# Patient Record
Sex: FEMALE | Race: WHITE | Employment: FULL TIME | URBAN - METROPOLITAN AREA
[De-identification: names, ages, dates, MRNs, and addresses within clinical notes are randomized per-mention and may not be internally consistent; named-entity substitution may affect disease eponyms.]

---

## 2017-01-12 LAB — CREATININE, EXTERNAL: 0.67

## 2017-02-03 ENCOUNTER — OFFICE VISIT (OUTPATIENT)
Dept: INTERNAL MEDICINE CLINIC | Age: 47
End: 2017-02-03

## 2017-02-03 VITALS
SYSTOLIC BLOOD PRESSURE: 94 MMHG | HEIGHT: 62 IN | HEART RATE: 114 BPM | RESPIRATION RATE: 14 BRPM | BODY MASS INDEX: 23.37 KG/M2 | OXYGEN SATURATION: 97 % | WEIGHT: 127 LBS | DIASTOLIC BLOOD PRESSURE: 66 MMHG | TEMPERATURE: 98.3 F

## 2017-02-03 DIAGNOSIS — J06.9 VIRAL UPPER RESPIRATORY TRACT INFECTION: Primary | ICD-10-CM

## 2017-02-03 RX ORDER — AZITHROMYCIN DIHYDRATE 250 MG/1
TABLET, FILM COATED ORAL
Qty: 6 TAB | Refills: 0 | Status: SHIPPED | OUTPATIENT
Start: 2017-02-03 | End: 2017-02-08

## 2017-02-03 RX ORDER — ZOLEDRONIC ACID 5 MG/100ML
5 INJECTION, SOLUTION INTRAVENOUS ONCE
COMMUNITY

## 2017-02-03 NOTE — PROGRESS NOTES
Morene Hodgkin is a 42 y.o. female who was seen in clinic today (2/3/2017). Patient was seen with Dr Renée Arnold (R3 at [facility]). Assessment & Plan:  Nick Lewis was seen today for chills. Diagnoses and all orders for this visit:    Viral upper respiratory tract infection- discussed diagnosis & treatment options, most likely viral at this time, reviewed the importance of avoiding unnecessary antibiotic therapy, reviewed which OTC medications to use and avoid, expected time course for resolution & red flags were reviewed with her to RTC or notify me. Due to immunosuppressive meds will give abx below to use if sx get worse.   -     ZITHROMAX Z-MANNY 250 mg tablet; Use as directed         Follow-up Disposition:  Return if symptoms worsen or fail to improve.       ----------------------------------------------------------------------    Subjective:  URI Review  Nick Lewis returns to clinic today to talk about: ISA symptoms for 1 day ago, which are gradually worsening since that time. She reports fevers up to 101.5 degrees, tachycardic, chills, sweats, sinus congestion and chest congestion. Treatments have included: Tylenol which have been somewhat effective. Relevant PMH: Crohns & SLE on immunosuppressant medications. Patient reports sick contacts: yes - son. Prior to Admission medications    Medication Sig Start Date End Date Taking? Authorizing Provider   zoledronic acid (RECLAST) 5 mg/100 mL soln 5 mg by IntraVENous route once. yearly   Yes Historical Provider   SYNTHROID 112 mcg tablet TAKE 2 TABLETS BY MOUTH EVERY MORNING BEFORE BREAKFAST 12/29/16  Yes Bobbi Cuellar MD   albuterol (PROVENTIL HFA, VENTOLIN HFA, PROAIR HFA) 90 mcg/actuation inhaler Take 1 Puff by inhalation every four (4) hours as needed for Wheezing or Shortness of Breath. Yes Historical Provider   CHOLECALCIFEROL, VITAMIN D3, (VITAMIN D3 PO) Take 5,000 Units by mouth daily.    Yes Historical Provider   allergy injection Yes Historical Provider   hydroxychloroquine (PLAQUENIL) 200 mg tablet Take 200 mg by mouth daily. Yes Historical Provider   cyanocobalamin (VITAMIN B-12) 1,000 mcg/mL injection 1,000 mcg by IntraMUSCular route every fourteen (14) days. Yes Historical Provider   acetaminophen-codeine (TYLENOL-CODEINE #3) 300-30 mg per tablet Take 1 tablet by mouth every four (4) hours as needed for Pain. 1-2 tablets   Yes Historical Provider   ethinyl estradiol-etonogestrel (NUVARING) 0.12-0.015 mg/24 hr vaginal ring 1 each by Intravaginally route every thirty (30) days. Yes Historical Provider   Cetirizine (ZYRTEC) 10 mg cap Take 1 capsule by mouth daily. Yes Historical Provider   BELIMUMAB (BENLYSTA IV) by IntraVENous route every thirty (30) days. Yes Historical Provider          Allergies   Allergen Reactions    Other Food Anaphylaxis     CARROTS    Pcn [Penicillins] Unknown (comments)     As a child - told not to take           ROS : per HPI       Objective:   Physical Exam   Constitutional: No distress. HENT:   Right Ear: Tympanic membrane is not erythematous and not bulging. No middle ear effusion. Left Ear: Tympanic membrane is not erythematous and not bulging. No middle ear effusion. Nose: No mucosal edema or rhinorrhea. Right sinus exhibits frontal sinus tenderness. Right sinus exhibits no maxillary sinus tenderness. Left sinus exhibits frontal sinus tenderness. Left sinus exhibits no maxillary sinus tenderness. Mouth/Throat: Uvula is midline and mucous membranes are normal. No oropharyngeal exudate or posterior oropharyngeal erythema. Eyes: Conjunctivae are normal. No scleral icterus. Neck: Neck supple. Cardiovascular: Regular rhythm and normal heart sounds. Tachycardia present. No murmur heard. Pulmonary/Chest: Effort normal and breath sounds normal. She has no wheezes. She has no rales. Lymphadenopathy:     She has no cervical adenopathy.          Visit Vitals    BP 94/66    Pulse (!) 114    Temp 98.3 °F (36.8 °C) (Oral)    Resp 14    Ht 5' 1.5\" (1.562 m)    Wt 127 lb (57.6 kg)    SpO2 97%    BMI 23.61 kg/m2         Disclaimer:  Advised her to call back or return to office if symptoms worsen/change/persist.  Discussed expected course/resolution/complications of diagnosis in detail with patient. Medication risks/benefits/costs/interactions/alternatives discussed with patient. She was given an after visit summary which includes diagnoses, current medications, & vitals. She expressed understanding with the diagnosis and plan.         Aleida Montero MD

## 2017-02-03 NOTE — PATIENT INSTRUCTIONS

## 2018-05-30 ENCOUNTER — OFFICE VISIT (OUTPATIENT)
Dept: INTERNAL MEDICINE CLINIC | Age: 48
End: 2018-05-30

## 2018-05-30 VITALS
RESPIRATION RATE: 16 BRPM | HEART RATE: 98 BPM | HEIGHT: 62 IN | DIASTOLIC BLOOD PRESSURE: 68 MMHG | SYSTOLIC BLOOD PRESSURE: 92 MMHG | OXYGEN SATURATION: 98 % | WEIGHT: 134 LBS | TEMPERATURE: 98.2 F | BODY MASS INDEX: 24.66 KG/M2

## 2018-05-30 DIAGNOSIS — K50.90 CROHN'S DISEASE WITHOUT COMPLICATION, UNSPECIFIED GASTROINTESTINAL TRACT LOCATION (HCC): ICD-10-CM

## 2018-05-30 DIAGNOSIS — R21 RASH AND NONSPECIFIC SKIN ERUPTION: ICD-10-CM

## 2018-05-30 DIAGNOSIS — M32.9 SYSTEMIC LUPUS ERYTHEMATOSUS, UNSPECIFIED SLE TYPE, UNSPECIFIED ORGAN INVOLVEMENT STATUS (HCC): ICD-10-CM

## 2018-05-30 DIAGNOSIS — J01.41 ACUTE RECURRENT PANSINUSITIS: Primary | ICD-10-CM

## 2018-05-30 RX ORDER — LEVOTHYROXINE SODIUM 125 UG/1
125 TABLET ORAL
COMMUNITY
End: 2020-01-14

## 2018-05-30 RX ORDER — LEVOTHYROXINE SODIUM 137 UG/1
TABLET ORAL
COMMUNITY
End: 2020-01-14

## 2018-05-30 NOTE — PROGRESS NOTES
Jane Collins is a 57 y.o. female who was seen in clinic today (5/30/2018). Assessment & Plan:   Diagnoses and all orders for this visit:    1. Acute recurrent pansinusitis- discussed diagnosis & treatment options, discussed allergic vs viral vs bacterial etiologies and at this time favor a viral etiology, reviewed the importance of avoiding unnecessary antibiotic therapy, reviewed which OTC medications to use and avoid. Did review low threshold for starting antibiotic on her 9due to #3 and #4) if symptoms do not improve in the next 2-3 days. Has done well with Z-Raheem in the past due to PCN allergy. .  Expected time course for resolution & red flags were reviewed with her to RTC or notify me. 2. Rash and nonspecific skin eruption- new dx, unclear etiology, differential diagnosis reviewed, and since that matches up with #1 favor more of a viral issue. Reassured does not look like fungal infection or shingles. Reviewed OTC steroid cream.  Red flags and expectations were reviewed & discussed with the her. She verbalized understanding. 3. Crohn's disease without complication, unspecified gastrointestinal tract location ([address]- stable, well controlled for her, defer to specialist    4. Systemic lupus erythematosus, unspecified SLE type, unspecified organ involvement status ([address]- fluctuating, not sure if #2 is related, will defer to specialist         Follow-up Disposition:  Return if symptoms worsen or fail to improve. Subjective:   Lauro Gowers was seen today for Sinus Pain    URI Review  Lauro Gowers returns to clinic today to talk about: URI symptoms for 2 days ago, which are gradually worsening since that time. She reports sore throat, post nasal drip, generalized sinus pain, right ear pain, fatigue, rash (lower back & upper abdomen, red, itching), rhinorrhea and sinus congestion. She denies a history of: fever, chills, chest congestion, wheezing and SOB/SCHAEFER.   Treatments have included: sudafed which have been not very effective. Relevant PMH: Crohns and SLE. Patient reports sick contacts: yes - son has similar issues. Prior to Admission medications    Medication Sig Start Date End Date Taking? Authorizing Provider   SYNTHROID 125 mcg tablet Take 125 mcg by mouth Daily (before breakfast). Takes every other day, alternating with 137 mcg every other day   Yes Historical Provider   SYNTHROID 137 mcg tablet Take  by mouth Daily (before breakfast). Takes every other day, alternating with 125 mcg every other day   Yes Historical Provider   OTHER Iron infusion as needed. Yes Historical Provider   zoledronic acid (RECLAST) 5 mg/100 mL soln 5 mg by IntraVENous route once. yearly   Yes Historical Provider   albuterol (PROVENTIL HFA, VENTOLIN HFA, PROAIR HFA) 90 mcg/actuation inhaler Take 1 Puff by inhalation every four (4) hours as needed for Wheezing or Shortness of Breath. Yes Historical Provider   CHOLECALCIFEROL, VITAMIN D3, (VITAMIN D3 PO) Take 5,000 Units by mouth daily. Yes Historical Provider   allergy injection    Yes Historical Provider   cyanocobalamin (VITAMIN B-12) 1,000 mcg/mL injection 1,000 mcg by IntraMUSCular route every fourteen (14) days. Yes Historical Provider   acetaminophen-codeine (TYLENOL-CODEINE #3) 300-30 mg per tablet Take 1 tablet by mouth every four (4) hours as needed for Pain. 1-2 tablets   Yes Historical Provider   ethinyl estradiol-etonogestrel (NUVARING) 0.12-0.015 mg/24 hr vaginal ring 1 each by Intravaginally route every thirty (30) days. Yes Historical Provider   Cetirizine (ZYRTEC) 10 mg cap Take 1 capsule by mouth daily. Yes Historical Provider   BELIMUMAB (BENLYSTA IV) by IntraVENous route every thirty (30) days.    Yes Historical Provider   SYNTHROID 112 mcg tablet TAKE 2 TABLETS BY MOUTH EVERY MORNING BEFORE BREAKFAST 12/29/16   Talia Berrios MD          Allergies   Allergen Reactions    Other Food Anaphylaxis     CARROTS    Pcn [Penicillins] Unknown (comments)     As a child - told not to take           ROS - per HPI        Objective:   Physical Exam   Constitutional: No distress. HENT:   Right Ear: Tympanic membrane is not erythematous and not bulging. No middle ear effusion. Left Ear: Tympanic membrane is not erythematous and not bulging. No middle ear effusion. Nose: No mucosal edema or rhinorrhea. Right sinus exhibits maxillary sinus tenderness and frontal sinus tenderness. Left sinus exhibits no maxillary sinus tenderness and no frontal sinus tenderness. Mouth/Throat: Uvula is midline and mucous membranes are normal. Posterior oropharyngeal erythema present. No oropharyngeal exudate. Eyes: Conjunctivae are normal. No scleral icterus. Neck: Neck supple. Cardiovascular: Regular rhythm and normal heart sounds. No murmur heard. Pulmonary/Chest: Effort normal and breath sounds normal. She has no wheezes. She has no rales. Lymphadenopathy:     She has no cervical adenopathy. Skin:   Macular and patches of erythema below the breasts and in between them bilaterally, dry, no scaling, no open lesion. There are several lesions in mid back         Visit Vitals    BP 92/68    Pulse 98    Temp 98.2 °F (36.8 °C) (Oral)    Resp 16    Ht 5' 1.5\" (1.562 m)    Wt 134 lb (60.8 kg)    SpO2 98%    BMI 24.91 kg/m2         Disclaimer:  Advised her to call back or return to office if symptoms worsen/change/persist.  Discussed expected course/resolution/complications of diagnosis in detail with patient. Medication risks/benefits/costs/interactions/alternatives discussed with patient. She was given an after visit summary which includes diagnoses, current medications, & vitals. She expressed understanding with the diagnosis and plan. Aspects of this note may have been generated using voice recognition software. Despite editing, there may be some syntax errors.        Vlad Sprague MD

## 2018-05-30 NOTE — PROGRESS NOTES
Sinus pressure since last night, green yellow discharge, right ear pain. Rash on upper abdomen and lower back. Started iron infusion last November.

## 2018-05-30 NOTE — PATIENT INSTRUCTIONS
Sinusitis: Care Instructions  Your Care Instructions    Sinusitis is an infection of the lining of the sinus cavities in your head. Sinusitis often follows a cold. It causes pain and pressure in your head and face. In most cases, sinusitis gets better on its own in 1 to 2 weeks. But some mild symptoms may last for several weeks. Sometimes antibiotics are needed. Follow-up care is a key part of your treatment and safety. Be sure to make and go to all appointments, and call your doctor if you are having problems. It's also a good idea to know your test results and keep a list of the medicines you take. How can you care for yourself at home? · Take an over-the-counter pain medicine, such as acetaminophen (Tylenol), ibuprofen (Advil, Motrin), or naproxen (Aleve). Read and follow all instructions on the label. · If the doctor prescribed antibiotics, take them as directed. Do not stop taking them just because you feel better. You need to take the full course of antibiotics. · Be careful when taking over-the-counter cold or flu medicines and Tylenol at the same time. Many of these medicines have acetaminophen, which is Tylenol. Read the labels to make sure that you are not taking more than the recommended dose. Too much acetaminophen (Tylenol) can be harmful. · Breathe warm, moist air from a steamy shower, a hot bath, or a sink filled with hot water. Avoid cold, dry air. Using a humidifier in your home may help. Follow the directions for cleaning the machine. · Use saline (saltwater) nasal washes to help keep your nasal passages open and wash out mucus and bacteria. You can buy saline nose drops at a grocery store or drugstore. Or you can make your own at home by adding 1 teaspoon of salt and 1 teaspoon of baking soda to 2 cups of distilled water. If you make your own, fill a bulb syringe with the solution, insert the tip into your nostril, and squeeze gently. Maddi Goad your nose.   · Put a hot, wet towel or a warm gel pack on your face 3 or 4 times a day for 5 to 10 minutes each time. · Try a decongestant nasal spray like oxymetazoline (Afrin). Do not use it for more than 3 days in a row. Using it for more than 3 days can make your congestion worse. When should you call for help? Call your doctor now or seek immediate medical care if:  ? · You have new or worse swelling or redness in your face or around your eyes. ? · You have a new or higher fever. ? Watch closely for changes in your health, and be sure to contact your doctor if:  ? · You have new or worse facial pain. ? · The mucus from your nose becomes thicker (like pus) or has new blood in it. ? · You are not getting better as expected. Where can you learn more? Go to http://ayesha-etienne.info/. Enter F878 in the search box to learn more about \"Sinusitis: Care Instructions. \"  Current as of: May 12, 2017  Content Version: 11.4  © 6528-2869 [employer]. Care instructions adapted under license by [employer] (which disclaims liability or warranty for this information). If you have questions about a medical condition or this instruction, always ask your healthcare professional. [address] any warranty or liability for your use of this information.

## 2018-06-01 ENCOUNTER — TELEPHONE (OUTPATIENT)
Dept: INTERNAL MEDICINE CLINIC | Age: 48
End: 2018-06-01

## 2018-06-01 NOTE — TELEPHONE ENCOUNTER
Pt calling wanting to let Dr Delfino Camarillo know she is feeling the same still has a rash, sinus drainage but no fever. Pt is scheduled to have an infusion today  1pm and wanted to know if Dr Delfino Camarillo wants her to still have the infusion or not.

## 2018-06-01 NOTE — TELEPHONE ENCOUNTER
Called the pt back and informed her spoke with Dr Humberto Garcia and he stated it looks like what pt has is viral and ok to go ahead with infusion but to check with her Rheumatologist to see ok with him.  Pt verbalized understanding and will contact her Rheumatologist.

## 2018-06-01 NOTE — TELEPHONE ENCOUNTER
Pt sent [employer] message to see if can get a response - Told by  to call today to see if she can get her infusion today.   Advise ASAP -[phone]

## 2018-07-30 LAB — CREATININE, EXTERNAL: 0.7

## 2018-10-20 LAB — CREATININE, EXTERNAL: 0.68

## 2018-12-18 LAB — CREATININE, EXTERNAL: 0.7

## 2019-07-19 ENCOUNTER — OFFICE VISIT (OUTPATIENT)
Dept: FAMILY MEDICINE CLINIC | Age: 49
End: 2019-07-19

## 2019-07-19 VITALS
BODY MASS INDEX: 25.86 KG/M2 | OXYGEN SATURATION: 98 % | HEART RATE: 88 BPM | WEIGHT: 137 LBS | RESPIRATION RATE: 14 BRPM | TEMPERATURE: 98.5 F | SYSTOLIC BLOOD PRESSURE: 106 MMHG | DIASTOLIC BLOOD PRESSURE: 75 MMHG | HEIGHT: 61 IN

## 2019-07-19 DIAGNOSIS — J02.9 VIRAL PHARYNGITIS: Primary | ICD-10-CM

## 2019-07-19 DIAGNOSIS — M32.9 SLE (SYSTEMIC LUPUS ERYTHEMATOSUS RELATED SYNDROME) (HCC): ICD-10-CM

## 2019-07-19 LAB
S PYO AG THROAT QL: NEGATIVE
VALID INTERNAL CONTROL?: YES

## 2019-07-19 RX ORDER — HYDROXYCHLOROQUINE SULFATE 200 MG/1
TABLET, FILM COATED ORAL
Refills: 3 | COMMUNITY
Start: 2019-07-03

## 2019-07-19 RX ORDER — PREDNISONE 10 MG/1
TABLET ORAL
Refills: 0 | COMMUNITY
Start: 2019-06-11 | End: 2020-01-14

## 2019-07-19 NOTE — PROGRESS NOTES
Subjective:   Ana Weston is a 54 y.o. female who complains of sore throat, swollen glands and dry cough for 7 days, stable since that time. Feels like everything is swollen - neck and joints. Just returned from work trip to [place]. No fevers, sinus pain, ear pain, nasal discharge, N/V/D. Reduced appetite. Has lupus which has been having flares this summer. Recently finished prednisone. Also has allergies, takes zyrtec & allergy shots. She denies a history of shortness of breath and wheezing. Evaluation to date: none. Treatment to date: OTC products. Patient does not smoke cigarettes. Relevant PMH:   Past Medical History:   Diagnosis Date    ASCUS on Pap smear 2/26/14    HPV negative    Cro[facility] ([facility]     ? remission    Fibromyalgia     Hashimoto's disease     Oral allergy syndrome     multiple food allergies    Osteoporosis     steroids & depo induced    Pernicious anemia     SLE [facility]systemic lupus erythematosus) [facility])      Past Surgical History:   Procedure Laterality Date    HX APPENDECTOMY  1990    HX COLONOSCOPY      HX TONSILLECTOMY       Allergies   Allergen Reactions    Other Food Anaphylaxis     CARROTS    Pcn [Penicillins] Unknown (comments)     As a child - told not to take         Review of Systems  Pertinent items are noted in HPI. Objective:     Visit Vitals  /75 (BP 1 Location: Left arm, BP Patient Position: Sitting)   Pulse 88   Temp 98.5 °F (36.9 °C) (Oral)   Resp 14   Ht 5' 1\" (1.549 m)   Wt 137 lb (62.1 kg)   SpO2 98%   BMI 25.89 kg/m²     General:  alert, cooperative, no distress   Eyes: negative   Ears: normal TM's and external ear canals AU   Sinuses: Normal paranasal sinuses without tenderness   Mouth:  Lips, mucosa, and tongue normal. Teeth and gums normal and normal findings: oropharynx pink & moist without lesions or evidence of thrush   Neck: supple, symmetrical, trachea midline and no adenopathy is appreciated.    Heart: S1 and S2 normal, no murmurs noted. Lungs: clear to auscultation bilaterally          Results for orders placed or performed in visit on 07/19/19   AMB POC RAPID STREP A   Result Value Ref Range    VALID INTERNAL CONTROL POC Yes     Group A Strep Ag Negative Negative       Assessment/Plan:       ICD-10-CM ICD-9-CM    1. Viral pharyngitis J02.9 462 AMB POC RAPID STREP A   2. SLE (systemic lupus erythematosus related syndrome) ([facility]) M32.9 710.0      Normal exam and negative RST. Suggested symptomatic OTC remedies. RTC prn. Jennett Harada, NP  This note will not be viewable in [address]

## 2019-07-19 NOTE — PROGRESS NOTES
Joel Delvalle is a 54 y.o. female      Room 2    Chief Complaint   Patient presents with    Sore Throat     1. Have you been to the ER, urgent care clinic since your last visit? Hospitalized since your last visit? No  2. Have you seen or consulted any other health care providers outside of the [address] [place] [address] since your last visit? Include any pap smears or colon screening ?  Yes, rheumatology     Visit Vitals  /75 (BP 1 Location: Left arm, BP Patient Position: Sitting)   Pulse 88   Temp 98.5 °F (36.9 °C) (Oral)   Resp 14   Ht 5' 1\" (1.549 m)   Wt 137 lb (62.1 kg)   SpO2 98%   BMI 25.89 kg/m²

## 2019-07-19 NOTE — PATIENT INSTRUCTIONS
Sore Throat: Care Instructions  Your Care Instructions    Infection by bacteria or a virus causes most sore throats. Cigarette smoke, dry air, air pollution, allergies, and yelling can also cause a sore throat. Sore throats can be painful and annoying. Fortunately, most sore throats go away on their own. If you have a bacterial infection, your doctor may prescribe antibiotics. Follow-up care is a key part of your treatment and safety. Be sure to make and go to all appointments, and call your doctor if you are having problems. It's also a good idea to know your test results and keep a list of the medicines you take. How can you care for yourself at home? · If your doctor prescribed antibiotics, take them as directed. Do not stop taking them just because you feel better. You need to take the full course of antibiotics. · Gargle with warm salt water once an hour to help reduce swelling and relieve discomfort. Use 1 teaspoon of salt mixed in 1 cup of warm water. · Take an over-the-counter pain medicine, such as acetaminophen (Tylenol), ibuprofen (Advil, Motrin), or naproxen (Aleve). Read and follow all instructions on the label. · Be careful when taking over-the-counter cold or flu medicines and Tylenol at the same time. Many of these medicines have acetaminophen, which is Tylenol. Read the labels to make sure that you are not taking more than the recommended dose. Too much acetaminophen (Tylenol) can be harmful. · Drink plenty of fluids. Fluids may help soothe an irritated throat. Hot fluids, such as tea or soup, may help decrease throat pain. · Use over-the-counter throat lozenges to soothe pain. Regular cough drops or hard candy may also help. These should not be given to young children because of the risk of choking. · Do not smoke or allow others to smoke around you. If you need help quitting, talk to your doctor about stop-smoking programs and medicines.  These can increase your chances of quitting for good. · Use a vaporizer or humidifier to add moisture to your bedroom. Follow the directions for cleaning the machine. When should you call for help? Call your doctor now or seek immediate medical care if:    · You have new or worse trouble swallowing.     · Your sore throat gets much worse on one side.    Watch closely for changes in your health, and be sure to contact your doctor if you do not get better as expected. Where can you learn more? Go to http://ayesha-etienne.info/. Enter 062 441 80 19 in the search box to learn more about \"Sore Throat: Care Instructions. \"  Current as of: March 27, 2018  Content Version: 11.9  © 9739-2130 [employer]. Care instructions adapted under license by [employer] (which disclaims liability or warranty for this information). If you have questions about a medical condition or this instruction, always ask your healthcare professional. [address] any warranty or liability for your use of this information.

## 2019-11-20 LAB — CREATININE, EXTERNAL: 0.67

## 2019-12-12 LAB — CREATININE, EXTERNAL: 0.66

## 2020-01-14 ENCOUNTER — OFFICE VISIT (OUTPATIENT)
Dept: INTERNAL MEDICINE CLINIC | Age: 50
End: 2020-01-14

## 2020-01-14 VITALS
HEART RATE: 115 BPM | TEMPERATURE: 100.8 F | RESPIRATION RATE: 18 BRPM | WEIGHT: 133 LBS | HEIGHT: 61 IN | BODY MASS INDEX: 25.11 KG/M2 | DIASTOLIC BLOOD PRESSURE: 70 MMHG | OXYGEN SATURATION: 97 % | SYSTOLIC BLOOD PRESSURE: 96 MMHG

## 2020-01-14 DIAGNOSIS — K50.90 CROHN'S DISEASE WITHOUT COMPLICATION, UNSPECIFIED GASTROINTESTINAL TRACT LOCATION (HCC): ICD-10-CM

## 2020-01-14 DIAGNOSIS — J11.1 INFLUENZA-LIKE ILLNESS: Primary | ICD-10-CM

## 2020-01-14 DIAGNOSIS — D84.9 IMMUNOSUPPRESSED STATUS (HCC): ICD-10-CM

## 2020-01-14 DIAGNOSIS — M32.9 SYSTEMIC LUPUS ERYTHEMATOSUS, UNSPECIFIED SLE TYPE, UNSPECIFIED ORGAN INVOLVEMENT STATUS (HCC): ICD-10-CM

## 2020-01-14 DIAGNOSIS — R50.9 FEVER, UNSPECIFIED FEVER CAUSE: ICD-10-CM

## 2020-01-14 LAB
FLUAV+FLUBV AG NOSE QL IA.RAPID: NEGATIVE POS/NEG
FLUAV+FLUBV AG NOSE QL IA.RAPID: NEGATIVE POS/NEG
VALID INTERNAL CONTROL?: YES

## 2020-01-14 RX ORDER — LEVOTHYROXINE AND LIOTHYRONINE 38; 9 UG/1; UG/1
60 TABLET ORAL 2 TIMES DAILY
COMMUNITY

## 2020-01-14 RX ORDER — OSELTAMIVIR PHOSPHATE 75 MG/1
75 CAPSULE ORAL 2 TIMES DAILY
Qty: 10 CAP | Refills: 0 | Status: SHIPPED | OUTPATIENT
Start: 2020-01-14 | End: 2020-01-19

## 2020-01-14 RX ORDER — MYCOPHENOLATE MOFETIL 500 MG/1
1000 TABLET ORAL 2 TIMES DAILY
COMMUNITY
End: 2021-08-04 | Stop reason: ALTCHOICE

## 2020-01-14 NOTE — PATIENT INSTRUCTIONS
Influenza (Flu): Care Instructions  Your Care Instructions    Influenza (flu) is an infection in the lungs and breathing passages. It is caused by the influenza virus. There are different strains, or types, of the flu virus from year to year. Unlike the common cold, the flu comes on suddenly and the symptoms, such as a cough, congestion, fever, chills, fatigue, aches, and pains, are more severe. These symptoms may last up to 10 days. Although the flu can make you feel very sick, it usually doesn't cause serious health problems. Home treatment is usually all you need for flu symptoms. But your doctor may prescribe antiviral medicine to prevent other health problems, such as pneumonia, from developing. Older people and those who have a long-term health condition, such as lung disease, are most at risk for having pneumonia or other health problems. Follow-up care is a key part of your treatment and safety. Be sure to make and go to all appointments, and call your doctor if you are having problems. It's also a good idea to know your test results and keep a list of the medicines you take. How can you care for yourself at home? · Get plenty of rest.  · Drink plenty of fluids, enough so that your urine is light yellow or clear like water. If you have kidney, heart, or liver disease and have to limit fluids, talk with your doctor before you increase the amount of fluids you drink. · Take an over-the-counter pain medicine if needed, such as acetaminophen (Tylenol), ibuprofen (Advil, Motrin), or naproxen (Aleve), to relieve fever, headache, and muscle aches. Read and follow all instructions on the label. No one younger than 20 should take aspirin. It has been linked to Reye syndrome, a serious illness. · Do not smoke. Smoking can make the flu worse. If you need help quitting, talk to your doctor about stop-smoking programs and medicines. These can increase your chances of quitting for good.   · Breathe moist air from a hot shower or from a sink filled with hot water to help clear a stuffy nose. · Before you use cough and cold medicines, check the label. These medicines may not be safe for young children or for people with certain health problems. · If the skin around your nose and lips becomes sore, put some petroleum jelly on the area. · To ease coughing:  ? Drink fluids to soothe a scratchy throat. ? Suck on cough drops or plain hard candy. ? Take an over-the-counter cough medicine that contains dextromethorphan to help you get some sleep. Read and follow all instructions on the label. ? Raise your head at night with an extra pillow. This may help you rest if coughing keeps you awake. · Take any prescribed medicine exactly as directed. Call your doctor if you think you are having a problem with your medicine. To avoid spreading the flu  · Wash your hands regularly, and keep your hands away from your face. · Stay home from school, work, and other public places until you are feeling better and your fever has been gone for at least 24 hours. The fever needs to have gone away on its own without the help of medicine. · Ask people living with you to talk to their doctors about preventing the flu. They may get antiviral medicine to keep from getting the flu from you. · To prevent the flu in the future, get a flu vaccine every fall. Encourage people living with you to get the vaccine. · Cover your mouth when you cough or sneeze. When should you call for help? Call 911 anytime you think you may need emergency care.  For example, call if:    · You have severe trouble breathing.    Call your doctor now or seek immediate medical care if:    · You have new or worse trouble breathing.     · You seem to be getting much sicker.     · You feel very sleepy or confused.     · You have a new or higher fever.     · You get a new rash.    Watch closely for changes in your health, and be sure to contact your doctor if:    · You begin to get better and then get worse.     · You are not getting better after 1 week. Where can you learn more? Go to http://ayesha-etienne.info/. Enter S622 in the search box to learn more about \"Influenza (Flu): Care Instructions. \"  Current as of: June 9, 2019  Content Version: 12.2  © 3470-6919 [employer]. Care instructions adapted under license by [employer] (which disclaims liability or warranty for this information). If you have questions about a medical condition or this instruction, always ask your healthcare professional. [address] any warranty or liability for your use of this information.

## 2020-01-14 NOTE — PROGRESS NOTES
Cesario Crouch is a 54 y.o. female who was seen in clinic today (1/14/2020) for an acute visit. Assessment & Plan:     Diagnoses and all orders for this visit:    1. Influenza-like illness- symptoms: are worsening, discussed differential diagnosis and at this time favor a viral etiology. Flu test negative. Not sure if to early to test positive. Do not think this is PNA, bronchitis, or PE. We reviewed treatment options and reviewed the importance of avoiding unnecessary antibiotic therapy, will start on meds below tomorrow if no improvement, reviewed which OTC medications to use and avoid. Red flags were reviewed with her, expected time course for resolution reviewed. Reviewed due to immunosuppression needs to monitor symptoms closely. -     oseltamivir (TAMIFLU) 75 mg capsule; Take 1 Cap by mouth two (2) times a day for 5 days. 2. Fever, unspecified fever cause  -     AMB POC LIBORIO INFLUENZA A/B TEST  ---> NEGATIVE     3. Systemic lupus erythematosus, unspecified SLE type, unspecified organ involvement status ([facility]- stable now, she reports bad year last year, defer to specialist, on immunosuppression medications    4. Crohn's disease without complication, unspecified gastrointestinal tract location [facility])- well controlled, continue current treatment (immunosuppresion) per specialist    5. Immunosuppressed status ([facility]      Follow-up and Dispositions    · Return if symptoms worsen or fail to improve. Subjective:   Soraya Moser was seen today for Cold Symptoms    URI Review  Soraya Moser returns to clinic today to talk about: flu symptoms for 1 day, which are gradually worsening since that time. She reports fevers up to 100 degrees and hot and cold spells, headache, bilateral ear pressure, rhinorrhea, dry cough, myalgias and chest discomfort. Treatments have included: T3 and zyrtec which have been somewhat effective/temporarily effective. Relevant PMH: Crohns and SLE.   Patient reports sick contacts: no one specific but she was in [place] last week for race week (5K, 10K, half marathon). She returned yesterday. Prior to Admission medications    Medication Sig Start Date End Date Taking? Authorizing Provider   thyroid, Pork, (ARMOUR THYROID) 60 mg tablet Take 60 mg by mouth two (2) times a day. Yes Provider, Historical   mycophenolate (CELLCEPT) 500 mg tablet Take 1,000 mg by mouth two (2) times a day. Yes Provider, Historical   hydroxychloroquine (PLAQUENIL) 200 mg tablet TAKE 2 TABLETS BY MOUTH ONCE A DAY 7/3/19  Yes Provider, Historical   OTHER Iron infusion as needed. Yes Provider, Historical   zoledronic acid (RECLAST) 5 mg/100 mL soln 5 mg by IntraVENous route once. yearly   Yes Provider, Historical   albuterol (PROVENTIL HFA, VENTOLIN HFA, PROAIR HFA) 90 mcg/actuation inhaler Take 1 Puff by inhalation every four (4) hours as needed for Wheezing or Shortness of Breath. Yes Provider, Historical   CHOLECALCIFEROL, VITAMIN D3, (VITAMIN D3 PO) Take 5,000 Units by mouth daily. Yes Provider, Historical   allergy injection    Yes Provider, Historical   cyanocobalamin (VITAMIN B-12) 1,000 mcg/mL injection 1,000 mcg by IntraMUSCular route every fourteen (14) days. Yes Provider, Historical   acetaminophen-codeine (TYLENOL-CODEINE #3) 300-30 mg per tablet Take 1 tablet by mouth every four (4) hours as needed for Pain. 1-2 tablets   Yes Provider, Historical   ethinyl estradiol-etonogestrel (NUVARING) 0.12-0.015 mg/24 hr vaginal ring 1 each by Intravaginally route every thirty (30) days. Yes Provider, Historical   Cetirizine (ZYRTEC) 10 mg cap Take 1 capsule by mouth daily. Yes Provider, Historical   BELIMUMAB (BENLYSTA IV) by IntraVENous route every thirty (30) days. Yes Provider, Historical   predniSONE (DELTASONE) 10 mg tablet PLEASE SEE ATTACHED FOR DETAILED DIRECTIONS 6/11/19 1/14/20  Provider, Historical   SYNTHROID 125 mcg tablet Take 125 mcg by mouth Daily (before breakfast). Takes every other day, alternating with 137 mcg every other day  1/14/20  Provider, Historical   SYNTHROID 137 mcg tablet Take  by mouth Daily (before breakfast). Takes every other day, alternating with 125 mcg every other day  1/14/20  Provider, Historical   SYNTHROID 112 mcg tablet TAKE 2 TABLETS BY MOUTH EVERY MORNING BEFORE BREAKFAST 12/29/16 1/14/20  Jocelyn Boateng MD          Allergies   Allergen Reactions    Other Food Anaphylaxis     CARROTS    Imuran [Azathioprine] Other (comments)     Developed pancreatitis    Pcn [Penicillins] Unknown (comments)     As a child - told not to take           ROS - per HPI        Objective:   Physical Exam  HENT:      Right Ear: Ear canal normal. No middle ear effusion. Tympanic membrane is not erythematous. Left Ear: Ear canal normal.  No middle ear effusion. Tympanic membrane is not erythematous. Nose: No congestion or rhinorrhea. Right Turbinates: Not swollen. Left Turbinates: Not swollen. Right Sinus: Frontal sinus tenderness present. No maxillary sinus tenderness. Left Sinus: Frontal sinus tenderness present. No maxillary sinus tenderness. Mouth/Throat:      Mouth: Mucous membranes are moist.      Pharynx: No oropharyngeal exudate or posterior oropharyngeal erythema. Cardiovascular:      Rate and Rhythm: Regular rhythm. Tachycardia present. Heart sounds: No murmur. Pulmonary:      Effort: Pulmonary effort is normal.      Breath sounds: No decreased breath sounds or wheezing. Chest:      Chest wall: Tenderness (frontal & posterior thorax) present. Lymphadenopathy:      Cervical: No cervical adenopathy.            Visit Vitals  BP 96/70   Pulse (!) 115   Temp (!) 100.8 °F (38.2 °C) (Oral)   Resp 18   Ht 5' 1\" (1.549 m)   Wt 133 lb (60.3 kg)   SpO2 97%   BMI 25.13 kg/m²         Disclaimer:  Advised her to call back or return to office if symptoms worsen/change/persist.  Discussed expected course/resolution/complications of diagnosis in detail with patient. Medication risks/benefits/costs/interactions/alternatives discussed with patient. She was given an after visit summary which includes diagnoses, current medications, & vitals. She expressed understanding with the diagnosis and plan. Aspects of this note may have been generated using voice recognition software. Despite editing, there may be some syntax errors.        Nohemi Schuster MD

## 2020-01-14 NOTE — PROGRESS NOTES
Cough, fever, headache, chest discomfort, scratchy throat, developed last night. Just returned from [facility].

## 2021-02-02 LAB — SARS-COV-2, NAA: NEGATIVE

## 2021-08-04 ENCOUNTER — OFFICE VISIT (OUTPATIENT)
Dept: INTERNAL MEDICINE CLINIC | Age: 51
End: 2021-08-04
Payer: COMMERCIAL

## 2021-08-04 VITALS
HEIGHT: 61 IN | TEMPERATURE: 97.8 F | SYSTOLIC BLOOD PRESSURE: 112 MMHG | DIASTOLIC BLOOD PRESSURE: 72 MMHG | HEART RATE: 78 BPM | OXYGEN SATURATION: 99 % | WEIGHT: 135 LBS | BODY MASS INDEX: 25.49 KG/M2

## 2021-08-04 DIAGNOSIS — K50.90 CROHN'S DISEASE WITHOUT COMPLICATION, UNSPECIFIED GASTROINTESTINAL TRACT LOCATION (HCC): ICD-10-CM

## 2021-08-04 DIAGNOSIS — M32.9 SYSTEMIC LUPUS ERYTHEMATOSUS, UNSPECIFIED SLE TYPE, UNSPECIFIED ORGAN INVOLVEMENT STATUS (HCC): Primary | ICD-10-CM

## 2021-08-04 DIAGNOSIS — H81.10 BENIGN PAROXYSMAL POSITIONAL VERTIGO, UNSPECIFIED LATERALITY: ICD-10-CM

## 2021-08-04 DIAGNOSIS — D84.9 IMMUNOSUPPRESSED STATUS (HCC): ICD-10-CM

## 2021-08-04 PROCEDURE — 99213 OFFICE O/P EST LOW 20 MIN: CPT | Performed by: INTERNAL MEDICINE

## 2021-08-04 RX ORDER — MECLIZINE HYDROCHLORIDE 25 MG/1
25 TABLET ORAL
COMMUNITY
Start: 2021-08-04

## 2021-08-04 RX ORDER — CALCIUM CARBONATE 600 MG
600 TABLET ORAL
COMMUNITY
End: 2021-08-04

## 2021-08-04 NOTE — PATIENT INSTRUCTIONS
Benign Paroxysmal Positional Vertigo (BPPV): Care Instructions  Your Care Instructions     Benign paroxysmal positional vertigo, also called BPPV, is an inner ear problem. It causes a spinning or whirling sensation when you move your head. This sensation is called vertigo. The vertigo usually lasts for less than a minute. People often have vertigo spells for a few days or weeks. Then the vertigo goes away. But it may come back again. The vertigo may be mild, or it may be bad enough to cause unsteadiness, nausea, and vomiting. When you move, your inner ear sends messages to the brain. This helps you keep your balance. Vertigo can happen when debris builds up in the inner ear. The buildup can cause the inner ear to send the wrong message to the brain. Your doctor may move you in different positions to help your vertigo get better faster. This is called the Epley maneuver. Your doctor may also prescribe medicines or exercises to help with your symptoms. Follow-up care is a key part of your treatment and safety. Be sure to make and go to all appointments, and call your doctor if you are having problems. It's also a good idea to know your test results and keep a list of the medicines you take. How can you care for yourself at home? · If your doctor suggests that you do Humphrey-Daroff exercises:  ? Sit on the edge of a bed or sofa. Quickly lie down on the side that causes the worst vertigo. Lie on your side with your ear down. ? Stay in this position for at least 30 seconds or until the vertigo goes away. ? Sit up. If this causes vertigo, wait for it to stop. ? Repeat the procedure on the other side. ? Repeat this 10 times. Do these exercises 2 times a day until the vertigo is gone. When should you call for help? Call 911 anytime you think you may need emergency care. For example, call if:    · You have symptoms of a stroke.  These may include:  ? Sudden numbness, tingling, weakness, or loss of movement in your face, arm, or leg, especially on only one side of your body. ? Sudden vision changes. ? Sudden trouble speaking. ? Sudden confusion or trouble understanding simple statements. ? Sudden problems with walking or balance. ? A sudden, severe headache that is different from past headaches. Call your doctor now or seek immediate medical care if:    · You have new or worse nausea and vomiting.     · You have new symptoms such as hearing loss or roaring in your ears. Watch closely for changes in your health, and be sure to contact your doctor if:    · You are not getting better as expected.     · Your vertigo gets worse. Where can you learn more? Go to http://www.gray.com/  Enter P372 in the search box to learn more about \"Benign Paroxysmal Positional Vertigo (BPPV): Care Instructions. \"  Current as of: December 2, 2020               Content Version: 12.8  © 1670-3356 [employer]. Care instructions adapted under license by [employer] (which disclaims liability or warranty for this information). If you have questions about a medical condition or this instruction, always ask your healthcare professional. [address] any warranty or liability for your use of this information. Vertigo: Exercises  Introduction  Here are some examples of exercises for you to try. The exercises may be suggested for a condition or for rehabilitation. Start each exercise slowly. Ease off the exercises if you start to have pain. You will be told when to start these exercises and which ones will work best for you. How to do the exercises  Exercise 1   1. Stand with a chair in front of you and a wall behind you. If you begin to fall, you may use them for support. 2. Stand with your feet together and your arms at your sides. 3. Move your head up and down 10 times. Exercise 2   1. Move your head side to side 10 times. Exercise 3   1.  Move your head diagonally up and down 10 times. Exercise 4   1. Move your head diagonally up and down 10 times on the other side. Follow-up care is a key part of your treatment and safety. Be sure to make and go to all appointments, and call your doctor if you are having problems. It's also a good idea to know your test results and keep a list of the medicines you take. Where can you learn more? Go to http://www.gray.com/  Enter F349 in the search box to learn more about \"Vertigo: Exercises. \"  Current as of: December 2, 2020               Content Version: 12.8  © 2006-2021 [employer]. Care instructions adapted under license by [employer] (which disclaims liability or warranty for this information). If you have questions about a medical condition or this instruction, always ask your healthcare professional. [address] any warranty or liability for your use of this information.

## 2021-08-04 NOTE — PROGRESS NOTES
Chief Complaint   Patient presents with    Ear Pain     bilateral ear pain     Dizziness         1. Have you been to the ER, urgent care clinic since your last visit? Hospitalized since your last visit? No    2. Have you seen or consulted any other health care providers outside of the [address] [place] [address] since your last visit? Include any pap smears or colon screening.  No

## 2021-08-04 NOTE — PROGRESS NOTES
HPI:  Miguel Ángel Bolivar is a 54y.o. year old female who is here for several day history of pain and pressure in her ears as well as some lightheadedness and dizziness. 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 She has a previous history of bad ear infection that caused her loss of hearing. She does have a sister with Ménière's syndrome. She has been using 2 meclizine tablets at a time but it is causing her sedation. She denies any true vertigo but feels unsteady and as if she is tilting all the time. Otherwise she has been feeling great. She feels that her lupus is in remission. She has been exercising for an Ironman competition and energy is good. Past Medical History:   Diagnosis Date    ASCUS on Pap smear 2/26/14    HPV negative    Crohn disease ([facility]     ? remission    Fibromyalgia     Hashimoto's disease     Oral allergy syndrome     multiple food allergies    Osteoporosis     steroids & depo induced    Pernicious anemia     SLE [facility]systemic lupus erythematosus) [facility]        Past Surgical History:   Procedure Laterality Date    HX APPENDECTOMY  1990    HX BLADDER SUSPENSION      HX COLONOSCOPY      HX TONSILLECTOMY         Prior to Admission medications    Medication Sig Start Date End Date Taking? Authorizing Provider   meclizine (ANTIVERT) 25 mg tablet Take 1 Tablet by mouth three (3) times daily as needed for Dizziness. 8/4/21  Yes Markel Brantley MD   thyroid, [facility] THYROID) 60 mg tablet Take 60 mg by mouth two (2) times a day. Yes Provider, Historical   hydroxychloroquine (PLAQUENIL) 200 mg tablet TAKE 2 TABLETS BY MOUTH ONCE A DAY 7/3/19  Yes Provider, Historical   OTHER Iron infusion as needed. Yes Provider, Historical   zoledronic acid (RECLAST) 5 mg/100 mL soln 5 mg by IntraVENous route once.  yearly   Yes Provider, Historical   albuterol (PROVENTIL HFA, VENTOLIN HFA, PROAIR HFA) 90 mcg/actuation inhaler Take 1 Puff by inhalation every four (4) hours as needed for Wheezing or Shortness of Breath. Yes Provider, Historical   CHOLECALCIFEROL, VITAMIN D3, (VITAMIN D3 PO) Take 5,000 Units by mouth daily. Yes Provider, Historical   cyanocobalamin (VITAMIN B-12) 1,000 mcg/mL injection 1,000 mcg by IntraMUSCular route every fourteen (14) days. Yes Provider, Historical   acetaminophen-codeine (TYLENOL-CODEINE #3) 300-30 mg per tablet Take 1 tablet by mouth every four (4) hours as needed for Pain. 1-2 tablets   Yes Provider, Historical   Cetirizine (ZYRTEC) 10 mg cap Take 1 capsule by mouth daily. Yes Provider, Historical   BELIMUMAB (BENLYSTA IV) by IntraVENous route every thirty (30) days. Yes Provider, Historical   calcium carbonate (CALTREX) 600 mg calcium (1,500 mg) tablet 600 mg.  8/4/21  Provider, Historical   mycophenolate (CELLCEPT) 500 mg tablet Take 1,000 mg by mouth two (2) times a day. 8/4/21  Provider, Historical   allergy injection   8/4/21  Provider, Historical   ethinyl estradiol-etonogestrel (NUVARING) 0.12-0.015 mg/24 hr vaginal ring 1 each by Intravaginally route every thirty (30) days. 8/4/21  Provider, Historical       Social History     Socioeconomic History    Marital status:      Spouse name: Not on file    Number of children: Not on file    Years of education: Not on file    Highest education level: Not on file   Occupational History    Not on file   Tobacco Use    Smoking status: Never Smoker    Smokeless tobacco: Never Used   Vaping Use    Vaping Use: Never used   Substance and Sexual Activity    Alcohol use:  Yes     Alcohol/week: 0.8 standard drinks     Types: 1 Glasses of wine per week    Drug use: No    Sexual activity: Yes     Birth control/protection: Inserts   Other Topics Concern    Not on file   Social History Narrative    Not on file     Social Determinants of Health     Financial Resource Strain:     Difficulty of Paying Living Expenses:    Food Insecurity:     Worried About Running Out of Food in the Last Year:     Ran Out of Food in the Last Year:    Transportation Needs:     Lack of Transportation (Medical):  Lack of Transportation (Non-Medical):    Physical Activity:     Days of Exercise per Week:     Minutes of Exercise per Session:    Stress:     Feeling of Stress :    Social Connections:     Frequency of Communication with Friends and Family:     Frequency of Social Gatherings with Friends and Family:     Attends Muslim Services:     Active Member of Clubs or Organizations:     Attends Club or Organization Meetings:     Marital Status:    Intimate Partner Violence:     Fear of Current or Ex-Partner:     Emotionally Abused:     Physically Abused:     Sexually Abused:           ROS  Per hPI    Visit Vitals  /72 (BP 1 Location: Left upper arm, BP Patient Position: Sitting, BP Cuff Size: Adult)   Pulse 78   Temp 97.8 °F (36.6 °C) (Temporal)   Ht 5' 1\" (1.549 m)   Wt 135 lb (61.2 kg)   SpO2 99%   BMI 25.51 kg/m²         Physical Exam   Physical Examination: General appearance - alert, well appearing, and in no distress  Eyes - pupils equal and reactive, extraocular eye movements intact, there is some mild nystagmus with leftward gaze.   Ears - bilateral TM's and external ear canals normal  Mouth - mucous membranes moist, pharynx normal without lesions  Neck - supple, no significant adenopathy  Lymphatics - no palpable lymphadenopathy, no hepatosplenomegaly  Chest - clear to auscultation, no wheezes, rales or rhonchi, symmetric air entry  Heart - normal rate, regular rhythm, normal S1, S2, no murmurs, rubs, clicks or gallops  Abdomen - soft, nontender, nondistended, no masses or organomegaly  Neurological - alert, oriented, normal speech, no focal findings or movement disorder noted, motor and sensory grossly normal bilaterally, Romberg sign negative, normal gait and station  Musculoskeletal - no joint tenderness, deformity or swelling  Extremities - peripheral pulses normal, no pedal edema, no clubbing or cyanosis      Assessment/Plan:  Diagnoses and all orders for this visit:    1. Systemic lupus erythematosus, unspecified SLE type, unspecified organ involvement status ([address] stable. 2. Immunosuppressed status (HCC)stable. 3. Crohn's disease without complication, unspecified gastrointestinal tract location (HCC)stable. 4. Benign paroxysmal positional vertigo, unspecified lateralitytry lower doses of meclizine along with exercises. If not improving, consider ENT evaluation.  -     REFERRAL TO ENT-OTOLARYNGOLOGY              Advised her to call back or return to office if symptoms worsen/change/persist.  Discussed expected course/resolution/complications of diagnosis in detail with patient. Medication risks/benefits/costs/interactions/alternatives discussed with patient. She was given an after visit summary which includes diagnoses, current medications, & vitals. She expressed understanding with the diagnosis and plan.

## 2021-09-21 NOTE — TELEPHONE ENCOUNTER
Requested Prescriptions     Pending Prescriptions Disp Refills    albuterol (PROVENTIL HFA, VENTOLIN HFA, PROAIR HFA) 90 mcg/actuation inhaler 18 g      Sig: Take 1 Puff by inhalation every four (4) hours as needed for Wheezing or Shortness of Breath.      [facility] #[id number]- [address], [address] [facility]  [phone]

## 2021-09-22 RX ORDER — ALBUTEROL SULFATE 90 UG/1
1 AEROSOL, METERED RESPIRATORY (INHALATION)
Qty: 18 G | OUTPATIENT
Start: 2021-09-22

## 2021-09-22 NOTE — TELEPHONE ENCOUNTER
Pt has not been seen for regular f/u in 5+ yrs, only urgent visits. Last visit was in Feb'20. Needs to schedule 15 min regular f/u with me in 2-3 months. Has VV with Dr Rivera Snell tomorrow.

## 2021-09-23 ENCOUNTER — VIRTUAL VISIT (OUTPATIENT)
Dept: INTERNAL MEDICINE CLINIC | Age: 51
End: 2021-09-23

## 2021-09-23 DIAGNOSIS — R06.02 SHORTNESS OF BREATH: ICD-10-CM

## 2021-09-23 DIAGNOSIS — R05.9 COUGH: Primary | ICD-10-CM

## 2021-09-23 RX ORDER — PSEUDOEPHEDRINE HYDROCHLORIDE 240 MG/1
TABLET, FILM COATED, EXTENDED RELEASE ORAL
COMMUNITY

## 2021-09-23 RX ORDER — GUAIFENESIN 600 MG/1
TABLET, EXTENDED RELEASE ORAL
COMMUNITY

## 2021-09-23 RX ORDER — LEVONORGESTREL 52 MG/1
INTRAUTERINE DEVICE INTRAUTERINE
COMMUNITY

## 2021-09-23 RX ORDER — ALBUTEROL SULFATE 90 UG/1
2 AEROSOL, METERED RESPIRATORY (INHALATION)
Qty: 18 G | Refills: 0 | Status: SHIPPED | OUTPATIENT
Start: 2021-09-23 | End: 2021-10-19 | Stop reason: SDUPTHER

## 2021-09-23 NOTE — PROGRESS NOTES
Vito Delgado is a 54 y.o. female who was seen by synchronous (real-time) audio-video technology on 9/23/2021. Assessment & Plan:   Below is the assessment and plan developed based on review of pertinent history, physical exam (if applicable), labs, studies, and medications. Upper respiratoroy infection   Possible sinusitis  Asthma  Immunosuppression  SLE     Plan: Patient is immunosuppressed, has severe cough and shortness of breath   Recommended in person evaluation to listen to lungs, check O2 sat and check PCR to Covid (previous antigen test day 2)  I have refilled her Albuterol  Patient agrees and will try to be evaluated in an urgent care. Subjective:   Vito Delgado was seen for Cold, Cough, and Nasal Congestion    Patient complains of 5-6 days of initially rhinitis and congestion, now severe cough, keeping her up at night, shortness of breath, some wheezing and maxillary facial pain. Her son was ill last week with similar illness. She tested negative to Covid via Antigen test Day 2 of illness. She denies loss of taste or smell and has not had fevers. She has been immunized with [employer] and received a booster. She is taking immunosuppressant medication for SLE. She does not have an Albuterol inhaler, but has used in past and would like a refill. She is also taking Sudafed, Delsym and Mucinex. Prior to Admission medications    Medication Sig Start Date End Date Taking? Authorizing Provider   guaiFENesin ER (Mucinex) 600 mg ER tablet 1 tablet as needed   Yes Provider, Historical   pseudoephedrine ER (Sudafed 24 Hour) 240 mg Tb24 tablet 1 tablet as needed   Yes Provider, Historical   levonorgestreL (Mirena) 20 mcg/24 hours (6 yrs) 52 mg IUD as directed   Yes Provider, Historical   meclizine (ANTIVERT) 25 mg tablet Take 1 Tablet by mouth three (3) times daily as needed for Dizziness.  8/4/21  Yes Amos Bauer MD   thyroid, [place], [facility] THYROID) 60 mg tablet Take 60 mg by mouth two (2) times a day. Yes Provider, Historical   hydroxychloroquine (PLAQUENIL) 200 mg tablet TAKE 2 TABLETS BY MOUTH ONCE A DAY 7/3/19  Yes Provider, Historical   OTHER Iron infusion as needed. Yes Provider, Historical   zoledronic acid (RECLAST) 5 mg/100 mL soln 5 mg by IntraVENous route once. yearly   Yes Provider, Historical   albuterol (PROVENTIL HFA, VENTOLIN HFA, PROAIR HFA) 90 mcg/actuation inhaler Take 1 Puff by inhalation every four (4) hours as needed for Wheezing or Shortness of Breath. Yes Provider, Historical   CHOLECALCIFEROL, VITAMIN D3, (VITAMIN D3 PO) Take 5,000 Units by mouth daily. Yes Provider, Historical   cyanocobalamin (VITAMIN B-12) 1,000 mcg/mL injection 1,000 mcg by IntraMUSCular route every fourteen (14) days. Yes Provider, Historical   acetaminophen-codeine (TYLENOL-CODEINE #3) 300-30 mg per tablet Take 1 tablet by mouth every four (4) hours as needed for Pain. 1-2 tablets   Yes Provider, Historical   Cetirizine (ZYRTEC) 10 mg cap Take 1 capsule by mouth daily. Yes Provider, Historical   BELIMUMAB (BENLYSTA IV) by IntraVENous route every thirty (30) days. Yes Provider, Historical       Patient Active Problem List   Diagnosis Code    SLE (systemic lupus erythematosus) ([facility] [address] M32.9    Hashimoto's disease E06.3    Osteoporosis M81.0    Food allergy Z91.018    Fibromyalgia M79.7    Pernicious anemia D51.0    Crohn [facility] K50.90    ASCUS on Pap smear [id number]    Multiple food allergies Z91.018     Current Outpatient Medications   Medication Sig Dispense Refill    guaiFENesin ER (Mucinex) 600 mg ER tablet 1 tablet as needed      pseudoephedrine ER (Sudafed 24 Hour) 240 mg Tb24 tablet 1 tablet as needed      levonorgestreL (Mirena) 20 mcg/24 hours (6 yrs) 52 mg IUD as directed      albuterol (PROVENTIL HFA, VENTOLIN HFA, PROAIR HFA) 90 mcg/actuation inhaler Take 2 Puffs by inhalation every four (4) hours as needed for Wheezing.  18 g 0    meclizine (ANTIVERT) 25 mg tablet Take 1 Tablet by mouth three (3) times daily as needed for Dizziness.  thyroid, [place], (ARMOUR THYROID) 60 mg tablet Take 60 mg by mouth two (2) times a day.  hydroxychloroquine (PLAQUENIL) 200 mg tablet TAKE 2 TABLETS BY MOUTH ONCE A DAY  3    OTHER Iron infusion as needed.  zoledronic acid (RECLAST) 5 mg/100 mL soln 5 mg by IntraVENous route once. yearly      albuterol (PROVENTIL HFA, VENTOLIN HFA, PROAIR HFA) 90 mcg/actuation inhaler Take 1 Puff by inhalation every four (4) hours as needed for Wheezing or Shortness of Breath.  CHOLECALCIFEROL, VITAMIN D3, (VITAMIN D3 PO) Take 5,000 Units by mouth daily.  cyanocobalamin (VITAMIN B-12) 1,000 mcg/mL injection 1,000 mcg by IntraMUSCular route every fourteen (14) days.  acetaminophen-codeine (TYLENOL-CODEINE #3) 300-30 mg per tablet Take 1 tablet by mouth every four (4) hours as needed for Pain. 1-2 tablets      Cetirizine (ZYRTEC) 10 mg cap Take 1 capsule by mouth daily.  BELIMUMAB (BENLYSTA IV) by IntraVENous route every thirty (30) days. Allergies   Allergen Reactions    Other Food Anaphylaxis     CARROTS    Imuran [Azathioprine] Other (comments)     Developed pancreatitis    Pcn [Penicillins] Unknown (comments)     As a child - told not to take       ROS - per HPI      Objective:   No flowsheet data found. General: alert, cooperative, no distress   Mental  status: normal mood, behavior, speech, dress, motor activity, and thought processes, able to follow commands   Eyes: EOM intact, normal sclera       Neck: no visualized mass   Resp: normal effort and no respiratory distress   Neuro: no gross deficits           Psychiatric: normal affect, no hallucinations     Ritika Otto, was evaluated through a synchronous (real-time) audio-video encounter. The patient (or guardian if applicable) is aware that this is a billable service. Verbal consent to proceed has been obtained within the past 12 months.  The visit was conducted pursuant to the emergency declaration under the [facility] [address] authority and the [employer] Act. Patient identification was verified, and a caregiver was present when appropriate. The patient was located in a state where the provider was credentialed to provide care.      Jeanette Mcmahon MD

## 2021-09-23 NOTE — PROGRESS NOTES
Chief Complaint   Patient presents with    Cold       1. Have you been to the ER, urgent care clinic since your last visit? Hospitalized since your last visit? No    2. Have you seen or consulted any other health care providers outside of the [address] [place] [address] since your last visit? Include any pap smears or colon screening.  No     Patient is ready for Doxy virtual visit

## 2021-09-29 RX ORDER — DOXYCYCLINE 100 MG/1
100 CAPSULE ORAL 2 TIMES DAILY
Qty: 14 CAPSULE | Refills: 0 | Status: SHIPPED | OUTPATIENT
Start: 2021-09-29 | End: 2021-10-06

## 2021-10-19 RX ORDER — ALBUTEROL SULFATE 90 UG/1
AEROSOL, METERED RESPIRATORY (INHALATION)
Qty: 8.5 EACH | OUTPATIENT
Start: 2021-10-19

## 2021-10-19 RX ORDER — ALBUTEROL SULFATE 90 UG/1
1 AEROSOL, METERED RESPIRATORY (INHALATION)
Qty: 18 G | Refills: 0 | Status: SHIPPED | OUTPATIENT
Start: 2021-10-19

## 2022-04-06 LAB — CREATININE, EXTERNAL: 0.58

## 2022-07-01 LAB — CREATININE, EXTERNAL: 0.54

## 2022-10-20 LAB — CREATININE, EXTERNAL: 0.52

## 2023-02-14 LAB — CREATININE, EXTERNAL: 0.6

## 2023-03-31 ENCOUNTER — OFFICE VISIT (OUTPATIENT)
Dept: INTERNAL MEDICINE CLINIC | Age: 53
End: 2023-03-31

## 2023-03-31 VITALS
SYSTOLIC BLOOD PRESSURE: 105 MMHG | WEIGHT: 135 LBS | TEMPERATURE: 98.3 F | RESPIRATION RATE: 18 BRPM | HEIGHT: 61 IN | OXYGEN SATURATION: 98 % | BODY MASS INDEX: 25.49 KG/M2 | DIASTOLIC BLOOD PRESSURE: 74 MMHG | HEART RATE: 88 BPM

## 2023-03-31 DIAGNOSIS — Z00.00 ROUTINE PHYSICAL EXAMINATION: Primary | ICD-10-CM

## 2023-03-31 DIAGNOSIS — Z23 ENCOUNTER FOR IMMUNIZATION: ICD-10-CM

## 2023-03-31 DIAGNOSIS — M81.6 LOCALIZED OSTEOPOROSIS WITHOUT CURRENT PATHOLOGICAL FRACTURE: ICD-10-CM

## 2023-03-31 DIAGNOSIS — M32.9 SYSTEMIC LUPUS ERYTHEMATOSUS, UNSPECIFIED SLE TYPE, UNSPECIFIED ORGAN INVOLVEMENT STATUS (HCC): ICD-10-CM

## 2023-03-31 DIAGNOSIS — Z71.89 ADVANCED CARE PLANNING/COUNSELING DISCUSSION: ICD-10-CM

## 2023-03-31 DIAGNOSIS — E06.3 HASHIMOTO'S DISEASE: ICD-10-CM

## 2023-03-31 RX ORDER — THYROID, PORCINE 30 MG/1
TABLET ORAL
COMMUNITY
Start: 2023-01-20

## 2023-03-31 RX ORDER — LEVOTHYROXINE AND LIOTHYRONINE 9.5; 2.25 UG/1; UG/1
15 TABLET ORAL DAILY
COMMUNITY

## 2023-03-31 RX ORDER — LEVOTHYROXINE AND LIOTHYRONINE 38; 9 UG/1; UG/1
60 TABLET ORAL DAILY
COMMUNITY

## 2023-03-31 NOTE — PROGRESS NOTES
Dante Chau is a 50 y.o. female who was seen in clinic today (3/31/2023) for a full physical.      Assessment & Plan:   Below is the assessment and plan developed based on review of pertinent history, physical exam, labs, studies, and medications. 1. Routine physical examination  -     AMB POC EKG ROUTINE W/ 12 LEADS, INTER & REP  -     LIPID PANEL; Future  -     METABOLIC PANEL, BASIC; Future  2. Advanced care planning/counseling discussion  -     FULL CODE  3. Encounter for immunization  -     varicella-zoster recombinant, PF, (SHINGRIX) 50 mcg/0.5 mL susr injection; 0.5 mL IM once now and then repeat in 2-6 months, Print, Disp-0.5 mL, R-1  4. Systemic lupus erythematosus, unspecified SLE type, unspecified organ involvement status ([facility] [address]  Assessment & Plan:  Stable, she is happy with control, monitored by specialist. No acute findings meriting change in the plan  5. Localized osteoporosis without current pathological fracture  Assessment & Plan:  Asymptomatic, on treatment, will get recent DEXA to review, monitored by specialist. No acute findings meriting change in the plan  6. Hashimoto's disease  Assessment & Plan:  Unclear control, asymptomatic, monitored by specialist. No acute findings meriting change in the plan    Follow-up and Dispositions    Return in about 2 years (around 3/31/2025) for FULL PHYSICAL. Subjective:   Sam Potter is here today for a full physical.      She was last seen for a CPE in '20. She has only been seen for acute visits since then, most recently Sept '21. She is followed by multiple specialists regularly. End of Life Planning  This was discussed with her today and she has an advanced directive - a copy HAS NOT been provided. Reviewed DNR/DNI and patient is not interested.     Health Maintenance  Immunizations:   Covid: up to date  Influenza: up to date  Tetanus: up to date  Shingles: due for Shingrix - script provided  Pneumonia: n/a - reviewed could consider this but will defer    Cancer screening:   Cervical: reviewed guidelines, UTD, done by OBGYN, records requested  Breast: reviewed guidelines, UTD, done by OBGYN, records requested  Colon: reviewed guidelines, up to date       DEXA: 10/15/20 - osteoporosis, records requested      The following sections were reviewed & updated as appropriate: Problem List, Allergies, PMH, PSH, FH, and SH. Prior to Admission medications    Medication Sig Start Date End Date Taking? Authorizing Provider   [place] Thyroid 30 mg tablet 1 TABLET ON AN EMPTY STOMACH ORALLY ONCE A DAY IN AFTERNOON WITH 15MG PILL 90 DAYS 1/20/23  Yes Provider, Historical   thyroid, Pork, ([place] Thyroid) 60 mg tablet Take 60 mg by mouth daily. Yes Provider, Historical   thyroid, Pork, ([place] Thyroid) 15 mg tablet Take 15 mg by mouth daily. Yes Provider, Historical   albuterol (PROVENTIL HFA, VENTOLIN HFA, PROAIR HFA) 90 mcg/actuation inhaler Take 1 Puff by inhalation every four (4) hours as needed for Wheezing or Shortness of Breath. 10/19/21  Yes Chin Huang MD   guaiFENesin ER [facility]) 600 mg ER tablet 1 tablet as needed   Yes Provider, Historical   pseudoephedrine ER (Sudafed 24 Hour) 240 mg Tb24 tablet 1 tablet as needed   Yes Provider, Historical   levonorgestreL (Mirena) 20 mcg/24 hours (6 yrs) 52 mg IUD as directed   Yes Provider, Historical   meclizine (ANTIVERT) 25 mg tablet Take 1 Tablet by mouth three (3) times daily as needed for Dizziness. 8/4/21  Yes Nisa Chung MD   hydroxychloroquine (PLAQUENIL) 200 mg tablet TAKE 2 TABLETS BY MOUTH ONCE A DAY 7/3/19  Yes Provider, Historical   OTHER Iron infusion as needed. Yes Provider, Historical   zoledronic acid (RECLAST) 5 mg/100 mL pgbk 5 mg by IntraVENous route once. yearly   Yes Provider, Historical   CHOLECALCIFEROL, VITAMIN D3, (VITAMIN D3 PO) Take 5,000 Units by mouth daily.    Yes Provider, Historical   cyanocobalamin (VITAMIN B12) 1,000 mcg/mL injection 1,000 mcg by IntraMUSCular route every fourteen (14) days. Yes Provider, Historical   acetaminophen-codeine (TYLENOL #3) 300-30 mg per tablet Take 1 tablet by mouth every four (4) hours as needed for Pain. 1-2 tablets   Yes Provider, Historical   Cetirizine 10 mg cap Take 1 capsule by mouth daily. Yes Provider, Historical   BELIMUMAB (BENLYSTA IV) by IntraVENous route every thirty (30) days. Yes Provider, Historical   thyroid, [place]ork, (ARMOUR THYROID) 60 mg tablet Take 60 mg by mouth two (2) times a day. 3/31/23  Provider, Historical           Review of Systems   Constitutional:  Negative for chills and fever. Respiratory:  Negative for cough and shortness of breath. Cardiovascular:  Negative for chest pain and palpitations. Gastrointestinal:  Negative for abdominal pain, blood in stool, constipation, diarrhea, heartburn, nausea and vomiting. Musculoskeletal:  Positive for joint pain (diffuse) and myalgias (diffuse). Neurological:  Negative for tingling, focal weakness and headaches. Endo/Heme/Allergies:  Does not bruise/bleed easily. Psychiatric/Behavioral:  Negative for depression. The patient is not nervous/anxious and does not have insomnia. Subjective:   Physical Exam  Constitutional:       General: She is not in acute distress. Appearance: She is well-developed. HENT:      Right Ear: Tympanic membrane and ear canal normal.      Left Ear: Tympanic membrane and ear canal normal.   Eyes:      Conjunctiva/sclera: Conjunctivae normal.   Cardiovascular:      Rate and Rhythm: Regular rhythm. Heart sounds: Normal heart sounds. No murmur heard. Pulmonary:      Effort: Pulmonary effort is normal.      Breath sounds: Normal breath sounds. Abdominal:      General: Bowel sounds are normal.      Palpations: Abdomen is soft. There is no hepatomegaly or splenomegaly. Tenderness: There is no abdominal tenderness. Musculoskeletal:      Right lower leg: No edema. Left lower leg: No edema. Lymphadenopathy:      Cervical: No cervical adenopathy.    Psychiatric:         Mood and Affect: Mood and affect normal.          Visit Vitals  /74   Pulse 88   Temp 98.3 °F (36.8 °C) (Temporal)   Resp 18   Ht 5' 1\" (1.549 m)   Wt 135 lb (61.2 kg)   SpO2 98%   BMI 25.51 kg/m²          Delma Jimenez MD

## 2023-03-31 NOTE — ACP (ADVANCE CARE PLANNING)
Advance Care Planning   Advance Care Planning (ACP) Physician/NP/PA (Provider) Conversation    Date of ACP Conversation: 03/31/23  Persons included in Conversation:  patient  Length of ACP Conversation in minutes: <16 minutes (Non-Billable)    Authorized Decision Maker (if patient is incapable of making informed decisions):   Named in Advance Directive or Healthcare Power of       Primary Decision Maker: Josemanuel Winter - [phone]    She has an advanced directive - a copy HAS NOT been provided. Reviewed DNR/DNI and patient is not interested- elects Full Code (attempt resuscitation).        April Magaña MD

## 2023-04-01 LAB
BUN SERPL-MCNC: 10 MG/DL (ref 6–24)
BUN/CREAT SERPL: 16 (ref 9–23)
CALCIUM SERPL-MCNC: 10.1 MG/DL (ref 8.7–10.2)
CHLORIDE SERPL-SCNC: 107 MMOL/L (ref 96–106)
CHOLEST SERPL-MCNC: 212 MG/DL (ref 100–199)
CO2 SERPL-SCNC: 26 MMOL/L (ref 20–29)
CREAT SERPL-MCNC: 0.63 MG/DL (ref 0.57–1)
EGFRCR SERPLBLD CKD-EPI 2021: 108 ML/MIN/1.73
GLUCOSE SERPL-MCNC: 98 MG/DL (ref 70–99)
HDLC SERPL-MCNC: 43 MG/DL
LDLC SERPL CALC-MCNC: 138 MG/DL (ref 0–99)
POTASSIUM SERPL-SCNC: 4.6 MMOL/L (ref 3.5–5.2)
SODIUM SERPL-SCNC: 146 MMOL/L (ref 134–144)
TRIGL SERPL-MCNC: 173 MG/DL (ref 0–149)
VLDLC SERPL CALC-MCNC: 31 MG/DL (ref 5–40)

## 2023-04-01 NOTE — ASSESSMENT & PLAN NOTE
Asymptomatic, on treatment, will get recent DEXA to review, monitored by specialist. No acute findings meriting change in the plan

## 2023-04-01 NOTE — ASSESSMENT & PLAN NOTE
Stable, she is happy with control, monitored by specialist. No acute findings meriting change in the plan

## 2023-04-01 NOTE — ASSESSMENT & PLAN NOTE
Unclear control, asymptomatic, monitored by specialist. No acute findings meriting change in the plan

## 2023-04-03 NOTE — PROGRESS NOTES
Results released to patient via Apparent. All labs are stable or at goal for her, except for high na (just barely, will monitor).   The 10-year ASCVD risk score (Darryn VELASCO, et al., 2019) is: 1.2%

## 2023-06-05 ENCOUNTER — TELEPHONE (OUTPATIENT)
Age: 53
End: 2023-06-05

## 2023-06-05 ENCOUNTER — E-VISIT (OUTPATIENT)
Age: 53
End: 2023-06-05

## 2023-06-05 DIAGNOSIS — U07.1 COVID-19: Primary | ICD-10-CM

## 2023-06-05 ASSESSMENT — LIFESTYLE VARIABLES: SMOKING_STATUS: NO, I HAVE NEVER SMOKED

## 2023-06-05 NOTE — TELEPHONE ENCOUNTER
2nd call from .  states pt could not submit an e-Visit through her [place] attempted to explain to  that wife needed to upgrade to new version and re-summit e-Visit.  request return call from nurse to discuss.  says wife can barely talk and he is calling on her behalf.

## 2023-06-05 NOTE — TELEPHONE ENCOUNTER
Verified patient identity with two identifiers. Spoke with patient by phone. Her difficulty speaking is due to coughing with speech. Instructed patient to try e-visit again per Erika but if not able will need to have VV with Tamika Galindo Tuesday. Patient verbalized understanding.

## 2023-06-05 NOTE — TELEPHONE ENCOUNTER
Reason for call:  TC from pt's , Samantha Wilson. Pt's  on PHI. Pt id verified.  states wife tested POS for Covid this morning and wanted Dr. Fox Malik to know that a [facility] was sent to him with a pic of card and how pt was feeling.  is requesting if wife can get a script for Paxlovid. PSR advised  that pt needed to submit an e-Visit to Dr. Fox Malik to address problem.   verbalized understanding but still wanted a telephone call message put through to .     Is this a new problem: Yes    Date of last appointment:  3/31/2023     Can we respond via [facility]: Yes    Best call back number: [phone]

## 2023-06-06 ENCOUNTER — TELEPHONE (OUTPATIENT)
Age: 53
End: 2023-06-06

## 2023-06-06 NOTE — TELEPHONE ENCOUNTER
On Call note - received page from pt's spouse overnight (midnight) requesting medication for COVID. Pt having sinus symptoms. Pt had been in communication with Dr Moshe Amor yesterday regarding her symptoms. I advised this request would be handled during business hours. This morning, Dr Moshe Amor has already sent in [address] [place] [address].      Lauren Arredondo MD

## 2023-06-08 ENCOUNTER — TELEPHONE (OUTPATIENT)
Age: 53
End: 2023-06-08

## 2023-06-08 RX ORDER — ONDANSETRON 8 MG/1
8 TABLET, ORALLY DISINTEGRATING ORAL EVERY 8 HOURS PRN
Qty: 15 TABLET | Refills: 0 | Status: SHIPPED | OUTPATIENT
Start: 2023-06-08

## 2023-06-29 LAB — CREATININE, EXTERNAL: 0.53

## 2023-08-24 LAB — CREATININE, EXTERNAL: 0.45
